# Patient Record
Sex: MALE | Race: WHITE | Employment: FULL TIME | URBAN - NONMETROPOLITAN AREA
[De-identification: names, ages, dates, MRNs, and addresses within clinical notes are randomized per-mention and may not be internally consistent; named-entity substitution may affect disease eponyms.]

---

## 2018-11-02 ENCOUNTER — TELEPHONE (OUTPATIENT)
Dept: SURGERY | Age: 58
End: 2018-11-02

## 2018-11-29 ENCOUNTER — TELEPHONE (OUTPATIENT)
Dept: SURGERY | Age: 58
End: 2018-11-29

## 2019-05-02 ENCOUNTER — HOSPITAL ENCOUNTER (OUTPATIENT)
Age: 59
Discharge: HOME | End: 2019-05-02
Payer: COMMERCIAL

## 2019-05-02 ENCOUNTER — HOSPITAL ENCOUNTER (OUTPATIENT)
Dept: ULTRASOUND IMAGING | Age: 59
Discharge: HOME | End: 2019-05-02
Payer: COMMERCIAL

## 2019-05-02 DIAGNOSIS — R79.89 ELEVATED LFTS: ICD-10-CM

## 2019-05-02 LAB
ALBUMIN SERPL-MCNC: 4 G/DL (ref 3.5–5.1)
ALP BLD-CCNC: 110 U/L (ref 38–126)
ALT SERPL-CCNC: 263 U/L (ref 11–66)
AMYLASE: 52 U/L (ref 20–104)
ANION GAP SERPL CALCULATED.3IONS-SCNC: 13 MEQ/L (ref 8–16)
AST SERPL-CCNC: 231 U/L (ref 5–40)
ATYPICAL LYMPHOCYTES: ABNORMAL %
BASOPHILS # BLD: 0.8 %
BASOPHILS ABSOLUTE: 0 THOU/MM3 (ref 0–0.1)
BILIRUB SERPL-MCNC: 1.9 MG/DL (ref 0.3–1.2)
BUN BLDV-MCNC: 14 MG/DL (ref 7–22)
CALCIUM SERPL-MCNC: 8.7 MG/DL (ref 8.5–10.5)
CHLORIDE BLD-SCNC: 103 MEQ/L (ref 98–111)
CO2: 25 MEQ/L (ref 23–33)
CREAT SERPL-MCNC: 1.1 MG/DL (ref 0.4–1.2)
EOSINOPHIL # BLD: 2.7 %
EOSINOPHILS ABSOLUTE: 0.1 THOU/MM3 (ref 0–0.4)
ERYTHROCYTE [DISTWIDTH] IN BLOOD BY AUTOMATED COUNT: 14.9 % (ref 11.5–14.5)
ERYTHROCYTE [DISTWIDTH] IN BLOOD BY AUTOMATED COUNT: 53 FL (ref 35–45)
GFR SERPL CREATININE-BSD FRML MDRD: 68 ML/MIN/1.73M2
GLUCOSE BLD-MCNC: 81 MG/DL (ref 70–108)
HAV IGM SER IA-ACNC: NEGATIVE
HCT VFR BLD CALC: 40.7 % (ref 42–52)
HEMOGLOBIN: 13.4 GM/DL (ref 14–18)
HEPATITIS B CORE IGM ANTIBODY: NEGATIVE
HEPATITIS B SURFACE ANTIGEN: NEGATIVE
HEPATITIS C ANTIBODY: NEGATIVE
IMMATURE GRANS (ABS): 0.03 THOU/MM3 (ref 0–0.07)
IMMATURE GRANULOCYTES: 0.6 %
LIPASE: 36 U/L (ref 5.6–51.3)
LYMPHOCYTES # BLD: 34 %
LYMPHOCYTES ABSOLUTE: 1.7 THOU/MM3 (ref 1–4.8)
MCH RBC QN AUTO: 32.1 PG (ref 26–33)
MCHC RBC AUTO-ENTMCNC: 32.9 GM/DL (ref 32.2–35.5)
MCV RBC AUTO: 97.6 FL (ref 80–94)
MONOCYTES # BLD: 21.7 %
MONOCYTES ABSOLUTE: 1.1 THOU/MM3 (ref 0.4–1.3)
NUCLEATED RED BLOOD CELLS: 0 /100 WBC
PLATELET # BLD: 130 THOU/MM3 (ref 130–400)
PLATELET ESTIMATE: ADEQUATE
PMV BLD AUTO: 9.6 FL (ref 9.4–12.4)
POTASSIUM SERPL-SCNC: 4.1 MEQ/L (ref 3.5–5.2)
RBC # BLD: 4.17 MILL/MM3 (ref 4.7–6.1)
SCAN OF BLOOD SMEAR: NORMAL
SEG NEUTROPHILS: 40.2 %
SEGMENTED NEUTROPHILS ABSOLUTE COUNT: 2 THOU/MM3 (ref 1.8–7.7)
SODIUM BLD-SCNC: 141 MEQ/L (ref 135–145)
TOTAL PROTEIN: 6.6 G/DL (ref 6.1–8)
WBC # BLD: 4.9 THOU/MM3 (ref 4.8–10.8)

## 2019-05-02 PROCEDURE — 85025 COMPLETE CBC W/AUTO DIFF WBC: CPT

## 2019-05-02 PROCEDURE — 36415 COLL VENOUS BLD VENIPUNCTURE: CPT

## 2019-05-02 PROCEDURE — 80074 ACUTE HEPATITIS PANEL: CPT

## 2019-05-02 PROCEDURE — 86663 EPSTEIN-BARR ANTIBODY: CPT

## 2019-05-02 PROCEDURE — 86664 EPSTEIN-BARR NUCLEAR ANTIGEN: CPT

## 2019-05-02 PROCEDURE — 86665 EPSTEIN-BARR CAPSID VCA: CPT

## 2019-05-02 PROCEDURE — 86644 CMV ANTIBODY: CPT

## 2019-05-02 PROCEDURE — 82150 ASSAY OF AMYLASE: CPT

## 2019-05-02 PROCEDURE — 86645 CMV ANTIBODY IGM: CPT

## 2019-05-02 PROCEDURE — 83690 ASSAY OF LIPASE: CPT

## 2019-05-02 PROCEDURE — 76700 US EXAM ABDOM COMPLETE: CPT

## 2019-05-02 PROCEDURE — 80053 COMPREHEN METABOLIC PANEL: CPT

## 2019-05-05 LAB
CMV IGM: 44.6 AU/ML
CYTOMEGALOVIRUS IGG ANTIBODY: 0.3 U/ML
EPSTEIN-BARR VIRUS ANTIBODIES: NORMAL

## 2022-12-27 ENCOUNTER — HOSPITAL ENCOUNTER (OUTPATIENT)
Dept: PHYSICAL THERAPY | Age: 62
Setting detail: THERAPIES SERIES
Discharge: HOME | End: 2022-12-27
Payer: COMMERCIAL

## 2022-12-27 PROCEDURE — 97161 PT EVAL LOW COMPLEX 20 MIN: CPT

## 2022-12-27 PROCEDURE — 97110 THERAPEUTIC EXERCISES: CPT

## 2022-12-27 ASSESSMENT — PAIN DESCRIPTION - LOCATION: LOCATION: NECK

## 2022-12-27 ASSESSMENT — PAIN SCALES - GENERAL: PAINLEVEL_OUTOF10: 4

## 2022-12-27 ASSESSMENT — PAIN - FUNCTIONAL ASSESSMENT: PAIN_FUNCTIONAL_ASSESSMENT: PREVENTS OR INTERFERES SOME ACTIVE ACTIVITIES AND ADLS

## 2022-12-27 ASSESSMENT — PAIN DESCRIPTION - ORIENTATION: ORIENTATION: LEFT

## 2022-12-27 ASSESSMENT — PAIN DESCRIPTION - DESCRIPTORS: DESCRIPTORS: SHARP;STABBING

## 2022-12-27 ASSESSMENT — PAIN DESCRIPTION - PAIN TYPE: TYPE: CHRONIC PAIN

## 2022-12-27 ASSESSMENT — PAIN DESCRIPTION - FREQUENCY: FREQUENCY: CONTINUOUS

## 2022-12-27 ASSESSMENT — PAIN DESCRIPTION - ONSET: ONSET: ON-GOING

## 2022-12-27 NOTE — PLAN OF CARE
[facility]    [x] [place]  Phone: [phone]  Fax: [phone]      [] [place]  Phone: [phone]  Fax: [phone]        To:   Kerwin Senior MD     Patient: Kaitlin Kohler  : 1960   MRN: [id number]  Evaluation Date: 2022      Diagnosis Information:  Diagnosis: Neck pain (M54.2), Cervical spondylosis without myelopathy (M 47.812), Facet syndrome of cervical spine (53.82)         Physical Therapy Certification    Dear Dr. Kerwin Senior MD  The following patient has been evaluated for physical therapy services and for therapy to continue, Medicare requires monthly physician review of the treatment plan. Please review the attached evaluation and/or summary of the patient's plan of care, and verify that you agree therapy should continue by signing the attached document and sending it back to our office. Plan of Care/Treatment to date:  [x] Therapeutic Exercise    [x] Modalities:  [x] Therapeutic Activity     [] Ultrasound  [x] Electrical Stimulation  [] Gait Training      [x] Cervical Traction [] Lumbar Traction  [] Neuromuscular Re-education    [x] Cold/hotpack [] Iontophoresis   [x] Instruction in HEP     Other:  [x] Manual Therapy      []             [] Aquatic Therapy      []                 Goals:  Short Term Goals  Time Frame for Short Term Goals: 2 weeks  Short Term Goal 1: Provide updated written HEP    Long Term Goals  Time Frame for Long Term Goals : 6 weeks  Long Term Goal 1: Patient will report no greater than 2-3/10 neck pain to allow patient to get a better nights rest.  Long Term Goal 2: Patient will score 3% or less on the NDI to allow patient to complete his daily ADLs with greater ease. Long Term Goal 3: Patient will demonstrate bilateral cervical rotation to 60 degrees to allow patient to look over his shoulder while driving with greater ease.   Long Term Goal 4: Patient will verbalize compliance with his HEP for continued progression following PT. Frequency/Duration: 12/27/22- 2/7/23  # Days per week: [x] 1 day # Weeks: [] 1 week [] 5 weeks     [x] 2 days   [] 2 weeks [x] 6 weeks     [] 3 days   [] 3 weeks [] 7 weeks     [] 4 days   [] 4 weeks [] 8 weeks    Rehab Potential: [] Excellent [x] Good [] Fair  [] Poor     Electronically signed by:  Joel Mccloud PT    If you have any questions or concerns, please don't hesitate to call.   Thank you for your referral.      Physician Signature:________________________________Date:__________________  By signing above, therapists plan is approved by physician
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
## 2022-12-27 NOTE — PROGRESS NOTES
Physical Therapy    Physical Therapy Daily Treatment Note    Date:  2022    Patient Name:  Jak Alexandra    :  1960  MRN: [id number]  Restrictions/Precautions:     Medical/Treatment Diagnosis Information:   Diagnosis: Neck pain (M54.2), Cervical spondylosis without myelopathy (M 47.812), Facet syndrome of cervical spine (53.82)     Insurance/Certification information:  PT Insurance Information: Joi Ashutosh  Physician Information:   Lyly Daniels MD  Plan of care signed (Y/N):  N  Visit# / total visits:    Pain level: 4/10       Time In: 305  Time Out: 340    Progress Note: [x]  Yes  []  No  Next due by: Visit #10  or by 23    Subjective:       Objective:   Observation:   Test measurements:      Exercises:   Exercise/Equipment Resistance/Repetitions Other comments   Scap retraction 10x5\"    Cervical retraction 10x    L SB 10x    Cervical retraction with ext 10x         Supine cervical retraction 10x    Manual  2' distraction        TB rows/ext                          traction     [x] Provided verbal/tactile cueing for activities related to strengthening, flexibility, endurance, ROM. (32524)  [] Provided verbal/tactile cueing for activities related to improving balance, coordination, kinesthetic sense, posture, motor skill, proprioception. (94111)    Therapeutic Activities:     [] Therapeutic activities, direct (one-on-one) patient contact (use of dynamic activities to improve functional performance). (99681)    Gait:   [] Provided training and instruction to the patient for ambulation re-education. (17271)    Self-Care/ADL's  [] Self-care/home management training and compensatory training, meal preparation, safety procedures, and instructions in use of assistive technology devices/adaptive equipment, direct one-on-one contact.  (00580)    Home Exercise Program:     [] Reviewed/Progressed HEP activities related to strengthening, flexibility, endurance, ROM. (38713)  [] Reviewed/Progressed HEP activities related to improving balance, coordination, kinesthetic sense, posture, motor skill, proprioception.  (01645)    Manual Treatments:    [x] Provided manual therapy to mobilize soft tissue/joints for the purpose of modulating pain, promoting relaxation,  increasing ROM, reducing/eliminating soft tissue swelling/inflammation/restriction, improving soft tissue extensibility. (48260)    Service Based Modalities:  27    Timed Code Treatment Minutes:   8    Total Treatment Minutes:   35    Treatment/Activity Tolerance:  [x] Patient tolerated treatment well [] Patient limited by fatique  [] Patient limited by pain  [] Patient limited by other medical complications  [] Other:     Prognosis: [x] Good [] Fair  [] Poor    Patient Requires Follow-up: [x] Yes  [] No      Goals:  Short Term Goals  Time Frame for Short Term Goals: 2 weeks  Short Term Goal 1: Provide updated written HEP    Long Term Goals  Time Frame for Long Term Goals : 6 weeks  Long Term Goal 1: Patient will report no greater than 2-3/10 neck pain to allow patient to get a better nights rest.  Long Term Goal 2: Patient will score 3% or less on the NDI to allow patient to complete his daily ADLs with greater ease. Long Term Goal 3: Patient will demonstrate bilateral cervical rotation to 60 degrees to allow patient to look over his shoulder while driving with greater ease. Long Term Goal 4: Patient will verbalize compliance with his HEP for continued progression following PT.     Plan:   [] Continue per plan of care [] Alter current plan (see comments)  [x] Plan of care initiated [] Hold pending MD visit [] Discharge    Plan for Next Session:      Electronically signed by:  Jillian Lopez PT

## 2022-12-29 ENCOUNTER — HOSPITAL ENCOUNTER (OUTPATIENT)
Dept: PHYSICAL THERAPY | Age: 62
Setting detail: THERAPIES SERIES
Discharge: HOME | End: 2022-12-29
Payer: COMMERCIAL

## 2022-12-29 PROCEDURE — 97140 MANUAL THERAPY 1/> REGIONS: CPT | Performed by: PHYSICAL THERAPIST

## 2022-12-29 PROCEDURE — 97110 THERAPEUTIC EXERCISES: CPT | Performed by: PHYSICAL THERAPIST

## 2022-12-29 NOTE — PROGRESS NOTES
Physical Therapy    Physical Therapy Daily Treatment Note    Date:  2022    Patient Name:  Girma Morgan    :  1960  MRN: [id number]  Restrictions/Precautions:     Medical/Treatment Diagnosis Information:   Diagnosis: Neck pain (M54.2), Cervical spondylosis without myelopathy (M 47.812), Facet syndrome of cervical spine (53.82)     Insurance/Certification information:  PT Insurance Information: Cornelius Valdez  Physician Information:   Leanna Orta MD  Plan of care signed (Y/N):  N  Visit# / total visits:    Pain level: 4/10       Time In: 1:36  Time Out: 2:25    Progress Note: []  Yes  [x]  No  Next due by: Visit #10  or by 23    Subjective:   \"My neck muscles are a little sore from doing the exercises, but that was to be expected. I don't have any new or increased pain though. \"    Objective:   Observation:   Test measurements:      Exercises:   Exercise/Equipment Resistance/Repetitions Other comments   Scap retraction 15x5\"    Cervical retraction 15x 3\"    L SB 10x    Cervical retraction with ext 10x    Cervical retraction with cervical rotation 3\" x 10 each         Supine cervical retraction 15x    Supine Elbow Press 15x    Supine Pec stretch 2 min    Manual  9' Distraction & distraction with extension        TB rows/ext BLUE 15x each    \"T\" at wall with stress ball behind occiput 15x                   traction See below    [x] Provided verbal/tactile cueing for activities related to strengthening, flexibility, endurance, ROM. (82138)  [] Provided verbal/tactile cueing for activities related to improving balance, coordination, kinesthetic sense, posture, motor skill, proprioception. (56345)    Therapeutic Activities:     [] Therapeutic activities, direct (one-on-one) patient contact (use of dynamic activities to improve functional performance). (99054)    Gait:   [] Provided training and instruction to the patient for ambulation re-education.  (75572)    Self-Care/ADL's  [] Self-care/home management training and compensatory training, meal preparation, safety procedures, and instructions in use of assistive technology devices/adaptive equipment, direct one-on-one contact. (01083)    Home Exercise Program:     [] Reviewed/Progressed HEP activities related to strengthening, flexibility, endurance, ROM. (99817)  [] Reviewed/Progressed HEP activities related to improving balance, coordination, kinesthetic sense, posture, motor skill, proprioception.  (55660)    Manual Treatments:    [x] Provided manual therapy to mobilize soft tissue/joints for the purpose of modulating pain, promoting relaxation,  increasing ROM, reducing/eliminating soft tissue swelling/inflammation/restriction, improving soft tissue extensibility. (71156)    Service Based Modalities:  10' traction  22# - 10#      30:10 sec  utilized for cervical spinal decompression and pain relief (NC)    Timed Code Treatment Minutes:   30' there-ex       9' manual    Total Treatment Minutes:  52'    Treatment/Activity Tolerance:  [x] Patient tolerated treatment well [] Patient limited by fatique  [] Patient limited by pain  [] Patient limited by other medical complications  [] Other:     Prognosis: [x] Good [] Fair  [] Poor    Patient Requires Follow-up: [x] Yes  [] No      Goals:  Short Term Goals  Time Frame for Short Term Goals: 2 weeks  Short Term Goal 1: Provide updated written HEP    Long Term Goals  Time Frame for Long Term Goals : 6 weeks  Long Term Goal 1: Patient will report no greater than 2-3/10 neck pain to allow patient to get a better nights rest.  Long Term Goal 2: Patient will score 3% or less on the NDI to allow patient to complete his daily ADLs with greater ease. Long Term Goal 3: Patient will demonstrate bilateral cervical rotation to 60 degrees to allow patient to look over his shoulder while driving with greater ease. Long Term Goal 4: Patient will verbalize compliance with his HEP for continued progression following PT.     Plan:   [x] Continue per plan of care [] Alter current plan (see comments)  [] Plan of care initiated [] Hold pending MD visit [] Discharge    Plan for Next Session:      Electronically signed by:  Adam Schultz PT, DPT

## 2023-01-03 ENCOUNTER — HOSPITAL ENCOUNTER (OUTPATIENT)
Dept: PHYSICAL THERAPY | Age: 63
Setting detail: THERAPIES SERIES
Discharge: HOME | End: 2023-01-03
Payer: COMMERCIAL

## 2023-01-03 PROCEDURE — 97110 THERAPEUTIC EXERCISES: CPT

## 2023-01-03 PROCEDURE — 97012 MECHANICAL TRACTION THERAPY: CPT

## 2023-01-03 NOTE — PROGRESS NOTES
Physical Therapy    Physical Therapy Daily Treatment Note    Date:  1/3/2023    Patient Name:  Jean Claude Hawley    :  1960  MRN: [id number]  Restrictions/Precautions:     Medical/Treatment Diagnosis Information:   Diagnosis: Neck pain (M54.2), Cervical spondylosis without myelopathy (M 47.812), Facet syndrome of cervical spine (53.82)     Insurance/Certification information:  PT Insurance Information: [address]  Physician Information:   Josiah Martin MD  Plan of care signed (Y/N):  N  Visit# / total visits:  3/12  Pain level: 4/10       Time In: 3:10  Time Out: 3:53    Progress Note: []  Yes  [x]  No  Next due by: Visit #10  or by 23    Subjective:   Patient states his neck is feeling about the same. Objective:   Observation:   Test measurements:      Exercises:   Exercise/Equipment Resistance/Repetitions Other comments   Scap retraction 15x5\"    Cervical retraction 15x 5\"    L SB 15x    Cervical retraction with ext 15x    Cervical retraction with cervical rotation 5\" x 15 each         Supine cervical retraction 15x    Supine Elbow Press 15x    Supine Pec stretch 2 min    Manual  5' Distraction & distraction with extension        TB rows/ext BLUE 15x each    \"T\" at wall with stress ball behind occiput 15x                   traction 15'    [x] Provided verbal/tactile cueing for activities related to strengthening, flexibility, endurance, ROM. (74778)  [] Provided verbal/tactile cueing for activities related to improving balance, coordination, kinesthetic sense, posture, motor skill, proprioception. (04724)    Therapeutic Activities:     [] Therapeutic activities, direct (one-on-one) patient contact (use of dynamic activities to improve functional performance). (83816)    Gait:   [] Provided training and instruction to the patient for ambulation re-education.  (50110)    Self-Care/ADL's  [] Self-care/home management training and compensatory training, meal preparation, safety procedures, and instructions in use of assistive technology devices/adaptive equipment, direct one-on-one contact. (98449)    Home Exercise Program:     [] Reviewed/Progressed HEP activities related to strengthening, flexibility, endurance, ROM. (96923)  [] Reviewed/Progressed HEP activities related to improving balance, coordination, kinesthetic sense, posture, motor skill, proprioception.  (98275)    Manual Treatments:    [x] Provided manual therapy to mobilize soft tissue/joints for the purpose of modulating pain, promoting relaxation,  increasing ROM, reducing/eliminating soft tissue swelling/inflammation/restriction, improving soft tissue extensibility. (00374)    Service Based Modalities:  15' traction  22# - 10#      30:10 sec  utilized for cervical spinal decompression and pain relief     Timed Code Treatment Minutes:   28' there-ex           Total Treatment Minutes:  37'    Treatment/Activity Tolerance:  [x] Patient tolerated treatment well [] Patient limited by fatique  [] Patient limited by pain  [] Patient limited by other medical complications  [] Other:     Prognosis: [x] Good [] Fair  [] Poor    Patient Requires Follow-up: [x] Yes  [] No      Goals:  Short Term Goals  Time Frame for Short Term Goals: 2 weeks  Short Term Goal 1: Provide updated written HEP (provided)    Long Term Goals  Time Frame for Long Term Goals : 6 weeks  Long Term Goal 1: Patient will report no greater than 2-3/10 neck pain to allow patient to get a better nights rest.  Long Term Goal 2: Patient will score 3% or less on the NDI to allow patient to complete his daily ADLs with greater ease. Long Term Goal 3: Patient will demonstrate bilateral cervical rotation to 60 degrees to allow patient to look over his shoulder while driving with greater ease. Long Term Goal 4: Patient will verbalize compliance with his HEP for continued progression following PT.     Plan:   [x] Continue per plan of care [] Alter current plan (see comments)  [] Plan of care initiated [] Hold pending MD visit [] Discharge    Plan for Next Session:      Electronically signed by:  Maki Morales PTA

## 2023-01-05 ENCOUNTER — APPOINTMENT (OUTPATIENT)
Dept: PHYSICAL THERAPY | Age: 63
End: 2023-01-05
Payer: COMMERCIAL

## 2023-01-10 ENCOUNTER — HOSPITAL ENCOUNTER (OUTPATIENT)
Dept: PHYSICAL THERAPY | Age: 63
Setting detail: THERAPIES SERIES
Discharge: HOME | End: 2023-01-10
Payer: COMMERCIAL

## 2023-01-10 PROCEDURE — 97110 THERAPEUTIC EXERCISES: CPT | Performed by: PHYSICAL THERAPY ASSISTANT

## 2023-01-10 PROCEDURE — 97012 MECHANICAL TRACTION THERAPY: CPT | Performed by: PHYSICAL THERAPY ASSISTANT

## 2023-01-10 NOTE — PROGRESS NOTES
Physical Therapy    Physical Therapy Daily Treatment Note    Date:  1/10/2023    Patient Name:  Bobbetta Burkitt    :  1960  MRN: [id number]  Restrictions/Precautions:     Medical/Treatment Diagnosis Information:   Diagnosis: Neck pain (M54.2), Cervical spondylosis without myelopathy (M 47.812), Facet syndrome of cervical spine (53.82)     Insurance/Certification information:  PT Insurance Information: [employer]  Physician Information:   Dieudonne Felix MD  Plan of care signed (Y/N):  N  Visit# / total visits:    Pain level: 3/10       Time In: 349  Time Out: 430    Progress Note: []  Yes  [x]  No  Next due by: Visit #10  or by 23    Subjective:   Notes pain this date rated 3/10. Notes sitting while hunting previous day with increased stiffness and discomfort. Objective: HANNA complete per flow chart to facilitate strength, motion and stability to allow ease with daily activities, sleep and work duties. Manual cervical distraction, retraction and extension performed. Patient notes nausea and some lightheadedness when returning to sitting. Peak ok to receive mechanical traction. Traction performed 22#-10# x10' to decrease pain and tightness. No nausea or lightheadedness noted at conclusion of session.      Observation:   Test measurements:      Exercises:   Exercise/Equipment Resistance/Repetitions Other comments   Scap retraction 15x5\"    Cervical retraction 10x 5\"    L SB 10x    Cervical retraction with ext 15x    Cervical retraction with cervical rotation 5\" x 10 each         Supine cervical retraction 15x    Supine Elbow Press 15x    Supine Pec stretch 2 min    Manual  5' Distraction & distraction with extension        TB rows/ext BLUE 15x each    \"T\" at wall with stress ball behind occiput 15x                   traction 15'    [x] Provided verbal/tactile cueing for activities related to strengthening, flexibility, endurance, ROM. (86114)  [] Provided verbal/tactile cueing for activities related to improving balance, coordination, kinesthetic sense, posture, motor skill, proprioception. (26101)    Therapeutic Activities:     [] Therapeutic activities, direct (one-on-one) patient contact (use of dynamic activities to improve functional performance). (26109)    Gait:   [] Provided training and instruction to the patient for ambulation re-education. (67083)    Self-Care/ADL's  [] Self-care/home management training and compensatory training, meal preparation, safety procedures, and instructions in use of assistive technology devices/adaptive equipment, direct one-on-one contact. (90313)    Home Exercise Program:     [] Reviewed/Progressed HEP activities related to strengthening, flexibility, endurance, ROM. (64812)  [] Reviewed/Progressed HEP activities related to improving balance, coordination, kinesthetic sense, posture, motor skill, proprioception.  (85215)    Manual Treatments:    [x] Provided manual therapy to mobilize soft tissue/joints for the purpose of modulating pain, promoting relaxation,  increasing ROM, reducing/eliminating soft tissue swelling/inflammation/restriction, improving soft tissue extensibility.  (63351)    Service Based Modalities: 10' traction  22# - 10#      30:10 sec  utilized for cervical spinal decompression and pain relief     Timed Code Treatment Minutes:   31' there-ex           Total Treatment Minutes:  39'    Treatment/Activity Tolerance:  [x] Patient tolerated treatment well [] Patient limited by fatique  [] Patient limited by pain  [] Patient limited by other medical complications  [] Other:     Prognosis: [x] Good [] Fair  [] Poor    Patient Requires Follow-up: [x] Yes  [] No      Goals:  Short Term Goals  Time Frame for Short Term Goals: 2 weeks  Short Term Goal 1: Provide updated written HEP (provided)    Long Term Goals  Time Frame for Long Term Goals : 6 weeks  Long Term Goal 1: Patient will report no greater than 2-3/10 neck pain to allow patient to get a better nights rest.  Long Term Goal 2: Patient will score 3% or less on the NDI to allow patient to complete his daily ADLs with greater ease. Long Term Goal 3: Patient will demonstrate bilateral cervical rotation to 60 degrees to allow patient to look over his shoulder while driving with greater ease. Long Term Goal 4: Patient will verbalize compliance with his HEP for continued progression following PT. Plan:   [x] Continue per plan of care [] Alter current plan (see comments)  [] Plan of care initiated [] Hold pending MD visit [] Discharge    Plan for Next Session:  Monitor tolerance and advance as able. Electronically signed by:   Heidy Banerjee PTA

## 2023-01-11 NOTE — PROGRESS NOTES
I have reviewed and agree to the content of the note written by the PTA.   Electronically signed by Nikhil Piedra PT [id number]

## 2023-01-12 ENCOUNTER — HOSPITAL ENCOUNTER (OUTPATIENT)
Dept: PHYSICAL THERAPY | Age: 63
Setting detail: THERAPIES SERIES
Discharge: HOME | End: 2023-01-12
Payer: COMMERCIAL

## 2023-01-12 PROCEDURE — 97012 MECHANICAL TRACTION THERAPY: CPT

## 2023-01-12 PROCEDURE — 97110 THERAPEUTIC EXERCISES: CPT

## 2023-01-12 NOTE — PROGRESS NOTES
Physical Therapy    Physical Therapy Daily Treatment Note    Date:  2023    Patient Name:  Kim Corrales    :  1960  MRN: [id number]  Restrictions/Precautions:     Medical/Treatment Diagnosis Information:   Diagnosis: Neck pain (M54.2), Cervical spondylosis without myelopathy (M 47.812), Facet syndrome of cervical spine (53.82)     Insurance/Certification information:  PT Insurance Information: [employer]  Physician Information:   Isidra Reaves MD  Plan of care signed (Y/N):  N  Visit# / total visits:    Pain level: 2-3/10     Time In: 332  Time Out: 408    Progress Note: []  Yes  [x]  No  Next due by: Visit #10  or by 23    Subjective:   Notes pain this date rated 2-3/10. Noting 6/10 pain at worst. Reports that when he is sitting for long periods of time the pain tends to get worse. Objective: HANNA complete per flow chart to facilitate strength, motion and stability to allow ease with daily activities, sleep and work duties. Verbal cueing utilized for proper form. Slight progressions made this date. Manual cervical distraction, retraction and extension performed. Traction performed 22#-10# x10' to decrease pain and tightness.      Observation:   Test measurements:      Exercises:   Exercise/Equipment Resistance/Repetitions Other comments   Scap retraction 15x5\"    Cervical retraction 10x 5\" 2 sets    L SB 10x    Cervical retraction with ext 15x 2 sets     Cervical retraction with cervical rotation 5\" x 10 each         Supine cervical retraction 15x    Supine Elbow Press 15x    Supine Pec stretch 2 min    Manual  5' Distraction & retraction, retraction with extension, L S, L rotation        TB rows/ext BLUE 15x each    \"T\" at wall with stress ball behind occiput 15x         traction 15'    [x] Provided verbal/tactile cueing for activities related to strengthening, flexibility, endurance, ROM. (28257)  [] Provided verbal/tactile cueing for activities related to improving balance, coordination, kinesthetic sense, posture, motor skill, proprioception. (03315)    Therapeutic Activities:     [] Therapeutic activities, direct (one-on-one) patient contact (use of dynamic activities to improve functional performance). (63563)    Gait:   [] Provided training and instruction to the patient for ambulation re-education. (91956)    Self-Care/ADL's  [] Self-care/home management training and compensatory training, meal preparation, safety procedures, and instructions in use of assistive technology devices/adaptive equipment, direct one-on-one contact. (61643)    Home Exercise Program:     [x] Reviewed/Progressed HEP activities related to strengthening, flexibility, endurance, ROM. (92156)  [] Reviewed/Progressed HEP activities related to improving balance, coordination, kinesthetic sense, posture, motor skill, proprioception.  (14642)    Manual Treatments:    [x] Provided manual therapy to mobilize soft tissue/joints for the purpose of modulating pain, promoting relaxation,  increasing ROM, reducing/eliminating soft tissue swelling/inflammation/restriction, improving soft tissue extensibility.  (75174)    Service Based Modalities: 10' traction  22# - 10#      30:10 sec  utilized for cervical spinal decompression and pain relief     Timed Code Treatment Minutes:   25' there-ex           Total Treatment Minutes:  28'    Treatment/Activity Tolerance:  [x] Patient tolerated treatment well [] Patient limited by fatique  [] Patient limited by pain  [] Patient limited by other medical complications  [] Other:     Prognosis: [x] Good [] Fair  [] Poor    Patient Requires Follow-up: [x] Yes  [] No      Goals:  Short Term Goals  Time Frame for Short Term Goals: 2 weeks  Short Term Goal 1: Provide updated written HEP (provided)    Long Term Goals  Time Frame for Long Term Goals : 6 weeks  Long Term Goal 1: Patient will report no greater than 2-3/10 neck pain to allow patient to get a better nights rest. (6/10 at worst)  Long Term Goal 2: Patient will score 3% or less on the NDI to allow patient to complete his daily ADLs with greater ease. Long Term Goal 3: Patient will demonstrate bilateral cervical rotation to 60 degrees to allow patient to look over his shoulder while driving with greater ease. Long Term Goal 4: Patient will verbalize compliance with his HEP for continued progression following PT. Plan:   [x] Continue per plan of care [] Alter current plan (see comments)  [] Plan of care initiated [] Hold pending MD visit [] Discharge    Plan for Next Session:  Monitor tolerance and advance as able.      Electronically signed by:  Amor Romero PT

## 2023-01-24 ENCOUNTER — HOSPITAL ENCOUNTER (OUTPATIENT)
Dept: PHYSICAL THERAPY | Age: 63
Setting detail: THERAPIES SERIES
Discharge: HOME | End: 2023-01-24
Payer: COMMERCIAL

## 2023-01-24 PROCEDURE — 97110 THERAPEUTIC EXERCISES: CPT | Performed by: PHYSICAL THERAPY ASSISTANT

## 2023-01-24 PROCEDURE — 97012 MECHANICAL TRACTION THERAPY: CPT | Performed by: PHYSICAL THERAPY ASSISTANT

## 2023-01-24 NOTE — PROGRESS NOTES
Physical Therapy    Physical Therapy Daily Treatment Note    Date:  2023    Patient Name:  Nesha Johnston    :  1960  MRN: [id number]  Restrictions/Precautions:     Medical/Treatment Diagnosis Information:   Diagnosis: Neck pain (M54.2), Cervical spondylosis without myelopathy (M 47.812), Facet syndrome of cervical spine (53.82)     Insurance/Certification information:  PT Insurance Information: [facility]  Physician Information:   Oneil Wood MD  Plan of care signed (Y/N):  N  Visit# / total visits:    Pain level: 2-3/10     Time In:  332  Time Out: 1463    Progress Note: []  Yes  [x]  No  Next due by: Visit #10  or by 23    Subjective:   Notes pain this date rated 2-3/10. Notes prolonged sitting increases pain. Increased sharp pain with left cervical rotation. Minimal change at this time. Objective: HANNA complete per flow chart to facilitate strength, motion and stability to allow ease with daily activities, sleep and work duties. Performed manual nags and snags blocking to left spinous process while patient performing left rotation x5 for each level. Able to abolish pain and discomfort with left rotation. Manual cervical distraction performed. Held manual retraction and extension this date. Traction performed 22#-10# x10' to decrease pain and tightness.      Observation:   Test measurements:      Exercises:   Exercise/Equipment Resistance/Repetitions Other comments   Scap retraction 15x5\"    Cervical retraction 10x 5\" 2 sets    L SB 10x    Cervical retraction with ext 15x 2 sets     Cervical retraction with cervical rotation 5\" x 10 each         Supine cervical retraction 15x    Supine Elbow Press 15x    Supine Pec stretch 2 min    Manual  5' Distraction & retraction, retraction with extension, L S, L rotation        TB rows/ext BLUE 15x each    \"T\" at wall with stress ball behind occiput 15x         traction 15'    [x] Provided verbal/tactile cueing for activities related to strengthening, flexibility, endurance, ROM. (26558)  [] Provided verbal/tactile cueing for activities related to improving balance, coordination, kinesthetic sense, posture, motor skill, proprioception. (15082)    Therapeutic Activities:     [] Therapeutic activities, direct (one-on-one) patient contact (use of dynamic activities to improve functional performance). (58120)    Gait:   [] Provided training and instruction to the patient for ambulation re-education. (58710)    Self-Care/ADL's  [] Self-care/home management training and compensatory training, meal preparation, safety procedures, and instructions in use of assistive technology devices/adaptive equipment, direct one-on-one contact. (98210)    Home Exercise Program:     [x] Reviewed/Progressed HEP activities related to strengthening, flexibility, endurance, ROM. (38420)  [] Reviewed/Progressed HEP activities related to improving balance, coordination, kinesthetic sense, posture, motor skill, proprioception.  (00805)    Manual Treatments:    [x] Provided manual therapy to mobilize soft tissue/joints for the purpose of modulating pain, promoting relaxation,  increasing ROM, reducing/eliminating soft tissue swelling/inflammation/restriction, improving soft tissue extensibility.  (34482)    Service Based Modalities: 15' traction  22# - 10#      30:10 sec  utilized for cervical spinal decompression and pain relief     Timed Code Treatment Minutes:   28' HANNA           Total Treatment Minutes:  37'     Treatment/Activity Tolerance:  [x] Patient tolerated treatment well [] Patient limited by fatique  [] Patient limited by pain  [] Patient limited by other medical complications  [] Other:     Prognosis: [x] Good [] Fair  [] Poor    Patient Requires Follow-up: [x] Yes  [] No      Goals:  Short Term Goals  Time Frame for Short Term Goals: 2 weeks  Short Term Goal 1: Provide updated written HEP (provided)    Long Term Goals  Time Frame for Long Term Goals : 6 weeks  Long Term Goal 1: Patient will report no greater than 2-3/10 neck pain to allow patient to get a better nights rest.   Long Term Goal 2: Patient will score 3% or less on the NDI to allow patient to complete his daily ADLs with greater ease. Long Term Goal 3: Patient will demonstrate bilateral cervical rotation to 60 degrees to allow patient to look over his shoulder while driving with greater ease. Long Term Goal 4: Patient will verbalize compliance with his HEP for continued progression following PT. Plan:   [x] Continue per plan of care [] Alter current plan (see comments)  [] Plan of care initiated [] Hold pending MD visit [] Discharge    Plan for Next Session:  Monitor tolerance and advance as able. Electronically signed by:   Oz Aaron PTA

## 2023-01-26 ENCOUNTER — HOSPITAL ENCOUNTER (OUTPATIENT)
Dept: PHYSICAL THERAPY | Age: 63
Setting detail: THERAPIES SERIES
Discharge: HOME | End: 2023-01-26
Payer: COMMERCIAL

## 2023-01-26 PROCEDURE — 97110 THERAPEUTIC EXERCISES: CPT

## 2023-01-26 PROCEDURE — 97012 MECHANICAL TRACTION THERAPY: CPT

## 2023-01-26 NOTE — PROGRESS NOTES
Physical Therapy    Physical Therapy Daily Treatment Note    Date:  2023    Patient Name:  Omid Gama    :  1960  MRN: [id number]  Restrictions/Precautions:     Medical/Treatment Diagnosis Information:   Diagnosis: Neck pain (M54.2), Cervical spondylosis without myelopathy (M 47.812), Facet syndrome of cervical spine (53.82)     Insurance/Certification information:  PT Insurance Information: [employer]  Physician Information:   Aj Loya MD  Plan of care signed (Y/N):  N  Visit# / total visits:    Pain level: 2-3/10     Time In:  3:20  Time Out: 4:00    Progress Note: []  Yes  [x]  No  Next due by: Visit #10  or by 23    Subjective:   pt reports feeling about the same. Pt reports feels good leaving however comes right back. Pt reports exercises do not seem to help. Objective: HANNA complete per flow chart to facilitate strength, motion and stability to allow ease with daily activities, sleep and work duties. Performed manual nags and snags blocking to left spinous process while patient performing left rotation x5 for each level. Able to abolish pain and discomfort with left rotation. Manual cervical distraction performed. Held manual retraction and extension this date. Traction performed 22#-10# x10' to decrease pain and tightness.      Observation:   Test measurements:      Exercises:   Exercise/Equipment Resistance/Repetitions Other comments   Scap retraction 15x5\"    Cervical retraction 10x 5\" 2 sets    L SB 10x    Cervical retraction with ext 15x 2 sets     Cervical retraction with cervical rotation 5\" x 10 each         Supine cervical retraction 15x    Supine Elbow Press 15x    Supine Pec stretch 2 min    Manual   Distraction & retraction, retraction with extension, L S, L rotation        TB rows/ext BLUE 15x each    \"T\" at wall with stress ball behind occiput 15x         traction 10'    [x] Provided verbal/tactile cueing for activities related to strengthening, flexibility, endurance, ROM. (11538)  [] Provided verbal/tactile cueing for activities related to improving balance, coordination, kinesthetic sense, posture, motor skill, proprioception. (85365)    Therapeutic Activities:     [] Therapeutic activities, direct (one-on-one) patient contact (use of dynamic activities to improve functional performance). (36523)    Gait:   [] Provided training and instruction to the patient for ambulation re-education. (25441)    Self-Care/ADL's  [] Self-care/home management training and compensatory training, meal preparation, safety procedures, and instructions in use of assistive technology devices/adaptive equipment, direct one-on-one contact. (63496)    Home Exercise Program:     [x] Reviewed/Progressed HEP activities related to strengthening, flexibility, endurance, ROM. (20788)  [] Reviewed/Progressed HEP activities related to improving balance, coordination, kinesthetic sense, posture, motor skill, proprioception.  (24658)    Manual Treatments:    [x] Provided manual therapy to mobilize soft tissue/joints for the purpose of modulating pain, promoting relaxation,  increasing ROM, reducing/eliminating soft tissue swelling/inflammation/restriction, improving soft tissue extensibility.  (83469)    Service Based Modalities: 10' traction  22# - 10#      30:10 sec  utilized for cervical spinal decompression and pain relief     Timed Code Treatment Minutes:   30' HANNA           Total Treatment Minutes:  36'     Treatment/Activity Tolerance:  [x] Patient tolerated treatment well [] Patient limited by fatique  [] Patient limited by pain  [] Patient limited by other medical complications  [] Other:     Prognosis: [x] Good [] Fair  [] Poor    Patient Requires Follow-up: [x] Yes  [] No      Goals:  Short Term Goals  Time Frame for Short Term Goals: 2 weeks  Short Term Goal 1: Provide updated written HEP (provided)    Long Term Goals  Time Frame for Long Term Goals : 6 weeks  Long Term Goal 1: Patient will report no greater than 2-3/10 neck pain to allow patient to get a better nights rest.   Long Term Goal 2: Patient will score 3% or less on the NDI to allow patient to complete his daily ADLs with greater ease. Long Term Goal 3: Patient will demonstrate bilateral cervical rotation to 60 degrees to allow patient to look over his shoulder while driving with greater ease. Long Term Goal 4: Patient will verbalize compliance with his HEP for continued progression following PT. Plan:   [x] Continue per plan of care [] Alter current plan (see comments)  [] Plan of care initiated [] Hold pending MD visit [] Discharge    Plan for Next Session:  Monitor tolerance and advance as able.      Electronically signed by:  Nhan Peterson PTA

## 2023-01-30 ENCOUNTER — HOSPITAL ENCOUNTER (OUTPATIENT)
Dept: PHYSICAL THERAPY | Age: 63
Setting detail: THERAPIES SERIES
Discharge: HOME | End: 2023-01-30
Payer: COMMERCIAL

## 2023-01-30 PROCEDURE — 97110 THERAPEUTIC EXERCISES: CPT

## 2023-01-30 NOTE — PROGRESS NOTES
Physical Therapy    Physical Therapy Daily Treatment Note    Date:  2023    Patient Name:  Bobbetta Burkitt    :  1960  MRN: [id number]  Restrictions/Precautions:     Medical/Treatment Diagnosis Information:   Diagnosis: Neck pain (M54.2), Cervical spondylosis without myelopathy (M 47.812), Facet syndrome of cervical spine (53.82)     Insurance/Certification information:  PT Insurance Information: [employer]  Physician Information:   Dieudonne Felix MD  Plan of care signed (Y/N):  N  Visit# / total visits:    Pain level: 3/10     Time In:  3:30  Time Out: 3:57    Progress Note: []  Yes  [x]  No  Next due by: Visit #10  or by 23    Subjective:   pt reports neck constantly getting worse especially when watching sporting games. Pt states has been doing exercises at home and does not seem to help. Pt only feels relief about 2 hours after session and pain comes right back. Pt reports 10% improvement temporarily. Pt would like to be on hold to see what next step with PCP is. Pt would like a MRI    Objective: HANNA complete per flow chart to facilitate strength, motion and stability to allow ease with daily activities, sleep and work duties. Performed manual nags and snags blocking to left spinous process while patient performing left rotation x5 for each level. Able to abolish pain and discomfort with left rotation.      Observation:   Test measurements:  NDI: 16% disability    Cervical Rotation: L: 55 degrees (after nags and snags)  R: 60 degrees    Exercises:   Exercise/Equipment Resistance/Repetitions Other comments   Scap retraction 15x5\"    Cervical retraction 10x 5\" 2 sets    L SB 10x    Cervical retraction with ext 15x 2 sets     Cervical retraction with cervical rotation 5\" x 10 each         Supine cervical retraction 15x    Supine Elbow Press 15x    Supine Pec stretch 2 min    Manual   Distraction & retraction, retraction with extension, L S, L rotation        TB rows/ext BLUE 15x each    \"T\" at wall with stress ball behind occiput 15x         traction     [x] Provided verbal/tactile cueing for activities related to strengthening, flexibility, endurance, ROM. (66677)  [] Provided verbal/tactile cueing for activities related to improving balance, coordination, kinesthetic sense, posture, motor skill, proprioception. (65147)    Therapeutic Activities:     [] Therapeutic activities, direct (one-on-one) patient contact (use of dynamic activities to improve functional performance). (59471)    Gait:   [] Provided training and instruction to the patient for ambulation re-education. (32025)    Self-Care/ADL's  [] Self-care/home management training and compensatory training, meal preparation, safety procedures, and instructions in use of assistive technology devices/adaptive equipment, direct one-on-one contact. (23277)    Home Exercise Program:     [x] Reviewed/Progressed HEP activities related to strengthening, flexibility, endurance, ROM. (43765)  [] Reviewed/Progressed HEP activities related to improving balance, coordination, kinesthetic sense, posture, motor skill, proprioception.  (00532)    Manual Treatments:    [x] Provided manual therapy to mobilize soft tissue/joints for the purpose of modulating pain, promoting relaxation,  increasing ROM, reducing/eliminating soft tissue swelling/inflammation/restriction, improving soft tissue extensibility.  (52113)    Service Based Modalities:     Timed Code Treatment Minutes:  27' HANNA           Total Treatment Minutes:  27'     Treatment/Activity Tolerance:  [x] Patient tolerated treatment well [] Patient limited by fatique  [] Patient limited by pain  [] Patient limited by other medical complications  [] Other:     Prognosis: [x] Good [] Fair  [] Poor    Patient Requires Follow-up: [x] Yes  [] No      Goals:  Short Term Goals  Time Frame for Short Term Goals: 2 weeks  Short Term Goal 1: Provide updated written HEP (provided)    Long Term Goals  Time Frame for Long Term Goals : 6 weeks  Long Term Goal 1: Patient will report no greater than 2-3/10 neck pain to allow patient to get a better nights rest.  (3/10 on average, 7/10 is worst)  Long Term Goal 2: Patient will score 3% or less on the NDI to allow patient to complete his daily ADLs with greater ease. (See above)  Long Term Goal 3: Patient will demonstrate bilateral cervical rotation to 60 degrees to allow patient to look over his shoulder while driving with greater ease. (See above)  Long Term Goal 4: Patient will verbalize compliance with his HEP for continued progression following PT.  (States compliance)    Plan:   [] Continue per plan of care [] Alter current plan (see comments)  [] Plan of care initiated [] Hold pending MD visit [] Discharge    Plan for Next Session:  30 day hold until 2/30/23     Electronically signed by:  Primo Hoyt PTA

## 2023-11-08 ENCOUNTER — HOSPITAL ENCOUNTER (OUTPATIENT)
Dept: MRI IMAGING | Age: 63
Discharge: HOME | End: 2023-11-08
Payer: COMMERCIAL

## 2023-11-08 DIAGNOSIS — M54.50 LOW BACK PAIN, UNSPECIFIED BACK PAIN LATERALITY, UNSPECIFIED CHRONICITY, UNSPECIFIED WHETHER SCIATICA PRESENT: ICD-10-CM

## 2023-11-08 PROCEDURE — 72148 MRI LUMBAR SPINE W/O DYE: CPT

## 2025-08-08 ENCOUNTER — HOSPITAL ENCOUNTER (OUTPATIENT)
Age: 65
Discharge: HOME | End: 2025-08-10
Payer: MEDICARE

## 2025-08-08 ENCOUNTER — HOSPITAL ENCOUNTER (OUTPATIENT)
Dept: CT IMAGING | Age: 65
Discharge: HOME | End: 2025-08-08
Payer: MEDICARE

## 2025-08-08 ENCOUNTER — TRANSCRIBE ORDERS (OUTPATIENT)
Dept: ADMINISTRATIVE | Age: 65
End: 2025-08-08

## 2025-08-08 ENCOUNTER — HOSPITAL ENCOUNTER (OUTPATIENT)
Dept: GENERAL RADIOLOGY | Age: 65
Discharge: HOME | End: 2025-08-08
Payer: MEDICARE

## 2025-08-08 DIAGNOSIS — R79.89 ELEVATED D-DIMER: ICD-10-CM

## 2025-08-08 DIAGNOSIS — R06.02 SHORTNESS OF BREATH: Primary | ICD-10-CM

## 2025-08-08 DIAGNOSIS — R06.00 DYSPNEA, UNSPECIFIED TYPE: ICD-10-CM

## 2025-08-08 DIAGNOSIS — R00.2 PALPITATIONS: ICD-10-CM

## 2025-08-08 DIAGNOSIS — R06.02 SHORTNESS OF BREATH: ICD-10-CM

## 2025-08-08 PROCEDURE — 6360000004 HC RX CONTRAST MEDICATION: Performed by: NURSE PRACTITIONER

## 2025-08-08 PROCEDURE — 71275 CT ANGIOGRAPHY CHEST: CPT

## 2025-08-08 PROCEDURE — 93225 XTRNL ECG REC<48 HRS REC: CPT

## 2025-08-08 RX ORDER — IOPAMIDOL 755 MG/ML
75 INJECTION, SOLUTION INTRAVASCULAR
Status: COMPLETED | OUTPATIENT
Start: 2025-08-08 | End: 2025-08-08

## 2025-08-08 RX ADMIN — IOPAMIDOL 75 ML: 755 INJECTION, SOLUTION INTRAVENOUS at 16:14

## 2025-08-10 ENCOUNTER — HOSPITAL ENCOUNTER (OUTPATIENT)
Dept: CT IMAGING | Age: 65
Discharge: HOME | End: 2025-08-10
Payer: MEDICARE

## 2025-08-10 DIAGNOSIS — R59.0 RETROPERITONEAL LYMPHADENOPATHY: ICD-10-CM

## 2025-08-10 PROCEDURE — 6360000004 HC RX CONTRAST MEDICATION: Performed by: NURSE PRACTITIONER

## 2025-08-10 PROCEDURE — 74178 CT ABD&PLV WO CNTR FLWD CNTR: CPT

## 2025-08-10 RX ORDER — IOPAMIDOL 755 MG/ML
100 INJECTION, SOLUTION INTRAVASCULAR
Status: COMPLETED | OUTPATIENT
Start: 2025-08-10 | End: 2025-08-10

## 2025-08-10 RX ADMIN — IOPAMIDOL 100 ML: 755 INJECTION, SOLUTION INTRAVENOUS at 11:13

## 2025-08-11 ENCOUNTER — TRANSCRIBE ORDERS (OUTPATIENT)
Dept: ADMINISTRATIVE | Age: 65
End: 2025-08-11

## 2025-08-11 DIAGNOSIS — R00.2 PALPITATIONS: Primary | ICD-10-CM
